# Patient Record
Sex: MALE | URBAN - METROPOLITAN AREA
[De-identification: names, ages, dates, MRNs, and addresses within clinical notes are randomized per-mention and may not be internally consistent; named-entity substitution may affect disease eponyms.]

---

## 2023-05-15 ENCOUNTER — TELEPHONE (OUTPATIENT)
Dept: UROLOGY | Facility: MEDICAL CENTER | Age: 53
End: 2023-05-15

## 2023-05-15 NOTE — TELEPHONE ENCOUNTER
Patient calling back regarding apt on Friday 5/19 around 315 for Dr Berenice Gandara  Patient states that he will take it  Please call patient back to confirm this apt once it is scheduled          CB: [phone]

## 2023-05-15 NOTE — TELEPHONE ENCOUNTER
Left message to see if pt can come in on Friday 5/19 around 315 for Dr Irena King, new pt 2nd opinion for BPH prostate surgery  If pt calls back and can take that time frame please let myself or Anastasiya Alfonso ( [facility] nurse ) know as we have to double book this apt  Thank you!

## 2023-05-15 NOTE — TELEPHONE ENCOUNTER
Returned call to patient  Left detailed message advising patient that he can discuss all the questions with the physician he will be seeing on Friday   Our office  would not be able to review treatment plan until the he has been evaluated by the MD and a plan has been recommended

## 2023-05-15 NOTE — TELEPHONE ENCOUNTER
Pt  Wants to speak to a  Nurse or PA  Regarding possible prostate surgery  He has some questions regarding a second opinion  Since there are no picture of the procedure the doctor did just the written report, would that procedure have to be done all over again? He also wants to know about laser surgery and if you do Aquablation  Pt  Is requesting a return call pls

## 2023-05-15 NOTE — TELEPHONE ENCOUNTER
New Patient Triage  Please Triage:   New Patient-   What is the reason for the patients appointment? Pt asking for a second opinion on prostate surgery for BPH  Imaging/Lab Results: Insurance: [employer]  Do we accept the pt's insurance or is the patient Self-Pay? Provider & Plan:    Member ID#: [id number]    History  Has the pt had any previous urologist?  Dr Jimi Stanley     Have pt records been requested? Care everywhere     Has the pt had any outside testing done? no    Does the pt have a personal history of cancer?: no    Requesting Dr Marlene Jordan but if he is too far out he will see another provider  PT  stated message with appointment information can be left on VM         Pt can be connacted at [phone]

## 2023-05-16 NOTE — TELEPHONE ENCOUNTER
Spoke w/ pt to ask if 1:30pm on fri was ok  He said he spoke to someone yesterday about an apt on Fri at 3:15pm then conf that apt (this time worked for his work schedule) He asked about the Aquablation procedure, which I advised Dr Ede Anne could answer any question and I was just making sure he gets scheduled  He said he WANTS to speak to someone on Dr Susana Arredondo team before his apt  It might not even be necessary until his questions get answered  So he is counting on a call back from someone (not ) ANDthe apt being 3:15pm not 1:30pm on Friday  Natananil Benavidez will need insurance info   TY

## 2023-05-18 NOTE — TELEPHONE ENCOUNTER
PT WAS LEFT MESSAGE THAT [facility] DOESN'T DO THAT PROCEDURE AS NOTED BELOW

## 2023-05-18 NOTE — TELEPHONE ENCOUNTER
Patient called to see if [facility] does the Aquablation procedure or not on the prostate  He wants to make sure if [facility] does the procedure before he goes to the appointment to not waste anyone's times  Please call patient back to confirm this procedure is done at the [facility]

## 2023-05-23 NOTE — TELEPHONE ENCOUNTER
Pt called in again regarding the procedure  I informed him that a message was left on his voicemail stating we do not perform that procedure  He understood

## 2024-08-08 DIAGNOSIS — Z00.6 ENCOUNTER FOR EXAMINATION FOR NORMAL COMPARISON OR CONTROL IN CLINICAL RESEARCH PROGRAM: ICD-10-CM
